# Patient Record
Sex: FEMALE | Race: WHITE | URBAN - METROPOLITAN AREA
[De-identification: names, ages, dates, MRNs, and addresses within clinical notes are randomized per-mention and may not be internally consistent; named-entity substitution may affect disease eponyms.]

---

## 2021-12-27 ENCOUNTER — OFFICE VISIT (OUTPATIENT)
Dept: URBAN - METROPOLITAN AREA CLINIC 51 | Facility: CLINIC | Age: 21
End: 2021-12-27
Payer: OTHER GOVERNMENT

## 2021-12-27 DIAGNOSIS — L23.9 ALLERGIC CONTACT DERMATITIS, UNSPECIFIED CAUSE: Primary | ICD-10-CM

## 2021-12-27 PROCEDURE — 92004 COMPRE OPH EXAM NEW PT 1/>: CPT | Performed by: OPTOMETRIST

## 2021-12-27 ASSESSMENT — KERATOMETRY
OD: 42.51
OS: 42.83

## 2021-12-27 ASSESSMENT — VISUAL ACUITY
OD: 20/20
OS: 20/20

## 2021-12-27 ASSESSMENT — INTRAOCULAR PRESSURE
OD: 13
OS: 13

## 2021-12-27 NOTE — IMPRESSION/PLAN
Impression: Allergic contact dermatitis, unspecified cause: L23.9. Plan: Start non scented face cleanser Use Aquafor ointment Not eye related and not effecting the health of eyes.  
refer to dermatology or allergist and rule out auto immune disorder
cool compresses